# Patient Record
Sex: MALE | Race: WHITE | Employment: UNEMPLOYED | URBAN - METROPOLITAN AREA
[De-identification: names, ages, dates, MRNs, and addresses within clinical notes are randomized per-mention and may not be internally consistent; named-entity substitution may affect disease eponyms.]

---

## 2019-01-01 ENCOUNTER — HOSPITAL ENCOUNTER (OUTPATIENT)
Dept: ULTRASOUND IMAGING | Age: 54
Discharge: HOME | End: 2019-06-05
Attending: INTERNAL MEDICINE | Admitting: INTERNAL MEDICINE
Payer: COMMERCIAL

## 2019-01-01 ENCOUNTER — HOSPITAL ENCOUNTER (OUTPATIENT)
Dept: ULTRASOUND IMAGING | Age: 54
Discharge: HOME | End: 2019-07-19
Attending: INTERNAL MEDICINE | Admitting: INTERNAL MEDICINE
Payer: MEDICAID

## 2019-01-01 ENCOUNTER — OFFICE VISIT (OUTPATIENT)
Dept: HEMATOLOGY | Age: 54
End: 2019-01-01

## 2019-01-01 ENCOUNTER — HOSPITAL ENCOUNTER (OUTPATIENT)
Dept: ULTRASOUND IMAGING | Age: 54
Discharge: HOME | End: 2019-06-05
Attending: INTERNAL MEDICINE | Admitting: INTERNAL MEDICINE

## 2019-01-01 ENCOUNTER — HOSPITAL ENCOUNTER (OUTPATIENT)
Dept: LAB | Age: 54
Discharge: HOME | End: 2019-05-09
Payer: MEDICAID

## 2019-01-01 ENCOUNTER — HOSPITAL ENCOUNTER (OUTPATIENT)
Dept: LAB | Age: 54
Discharge: HOME | End: 2019-06-05
Attending: INTERNAL MEDICINE | Admitting: INTERNAL MEDICINE
Payer: COMMERCIAL

## 2019-01-01 VITALS
SYSTOLIC BLOOD PRESSURE: 104 MMHG | TEMPERATURE: 99.2 F | RESPIRATION RATE: 22 BRPM | TEMPERATURE: 98.3 F | HEART RATE: 72 BPM | DIASTOLIC BLOOD PRESSURE: 44 MMHG | RESPIRATION RATE: 22 BRPM | BODY MASS INDEX: 23.66 KG/M2 | DIASTOLIC BLOOD PRESSURE: 53 MMHG | HEIGHT: 71 IN | OXYGEN SATURATION: 96 % | WEIGHT: 169 LBS | HEIGHT: 71 IN | HEART RATE: 112 BPM | SYSTOLIC BLOOD PRESSURE: 89 MMHG | OXYGEN SATURATION: 98 % | WEIGHT: 168 LBS | BODY MASS INDEX: 23.52 KG/M2

## 2019-01-01 VITALS
OXYGEN SATURATION: 98 % | WEIGHT: 157 LBS | RESPIRATION RATE: 16 BRPM | DIASTOLIC BLOOD PRESSURE: 47 MMHG | SYSTOLIC BLOOD PRESSURE: 107 MMHG | BODY MASS INDEX: 21.98 KG/M2 | HEIGHT: 71 IN | TEMPERATURE: 99.9 F | HEART RATE: 85 BPM

## 2019-01-01 VITALS
DIASTOLIC BLOOD PRESSURE: 57 MMHG | OXYGEN SATURATION: 96 % | WEIGHT: 168.9 LBS | BODY MASS INDEX: 23.65 KG/M2 | HEIGHT: 71 IN | HEART RATE: 68 BPM | RESPIRATION RATE: 20 BRPM | SYSTOLIC BLOOD PRESSURE: 102 MMHG | TEMPERATURE: 98.3 F

## 2019-01-01 VITALS
HEART RATE: 69 BPM | TEMPERATURE: 97.8 F | WEIGHT: 168.5 LBS | SYSTOLIC BLOOD PRESSURE: 74 MMHG | HEIGHT: 71 IN | RESPIRATION RATE: 16 BRPM | DIASTOLIC BLOOD PRESSURE: 34 MMHG | OXYGEN SATURATION: 100 % | BODY MASS INDEX: 23.59 KG/M2

## 2019-01-01 DIAGNOSIS — R18.8 OTHER ASCITES: ICD-10-CM

## 2019-01-01 DIAGNOSIS — K70.31 ALCOHOLIC CIRRHOSIS OF LIVER WITH ASCITES (HCC): Primary | ICD-10-CM

## 2019-01-01 DIAGNOSIS — K70.31 ALCOHOLIC CIRRHOSIS OF LIVER WITH ASCITES (HCC): ICD-10-CM

## 2019-01-01 DIAGNOSIS — K70.30 ALCOHOLIC CIRRHOSIS OF LIVER WITHOUT ASCITES (HCC): ICD-10-CM

## 2019-01-01 DIAGNOSIS — K70.30 ALCOHOLIC CIRRHOSIS OF LIVER WITHOUT ASCITES (HCC): Primary | ICD-10-CM

## 2019-01-01 LAB
AFP L3 MFR SERPL: 11.6 % (ref 0–9.9)
AFP SERPL-MCNC: 4.2 NG/ML (ref 0–8)
ALBUMIN SERPL-MCNC: 1.8 G/DL (ref 3.4–5)
ALBUMIN SERPL-MCNC: 2.1 G/DL (ref 3.4–5)
ALBUMIN/GLOB SERPL: 0.3 {RATIO} (ref 0.8–1.7)
ALBUMIN/GLOB SERPL: 0.4 {RATIO} (ref 0.8–1.7)
ALP SERPL-CCNC: 171 U/L (ref 45–117)
ALP SERPL-CCNC: 256 U/L (ref 45–117)
ALT SERPL-CCNC: 26 U/L (ref 16–61)
ALT SERPL-CCNC: 26 U/L (ref 16–61)
ANION GAP SERPL CALC-SCNC: 7 MMOL/L (ref 3–18)
ANION GAP SERPL CALC-SCNC: 9 MMOL/L (ref 3–18)
APPEARANCE FLD: ABNORMAL
AST SERPL-CCNC: 86 U/L (ref 15–37)
AST SERPL-CCNC: 95 U/L (ref 15–37)
BASOPHILS # BLD: 0 K/UL (ref 0–0.1)
BASOPHILS # BLD: 0 K/UL (ref 0–0.1)
BASOPHILS NFR BLD: 0 % (ref 0–2)
BASOPHILS NFR BLD: 0 % (ref 0–3)
BILIRUB DIRECT SERPL-MCNC: 2.4 MG/DL (ref 0–0.2)
BILIRUB DIRECT SERPL-MCNC: 2.7 MG/DL (ref 0–0.2)
BILIRUB SERPL-MCNC: 3.9 MG/DL (ref 0.2–1)
BILIRUB SERPL-MCNC: 5 MG/DL (ref 0.2–1)
BUN SERPL-MCNC: 10 MG/DL (ref 7–18)
BUN SERPL-MCNC: 9 MG/DL (ref 7–18)
BUN/CREAT SERPL: 9 (ref 12–20)
BUN/CREAT SERPL: 9 (ref 12–20)
CALCIUM SERPL-MCNC: 7.7 MG/DL (ref 8.5–10.1)
CALCIUM SERPL-MCNC: 7.9 MG/DL (ref 8.5–10.1)
CHLORIDE SERPL-SCNC: 104 MMOL/L (ref 100–108)
CHLORIDE SERPL-SCNC: 99 MMOL/L (ref 100–108)
CO2 SERPL-SCNC: 24 MMOL/L (ref 21–32)
CO2 SERPL-SCNC: 26 MMOL/L (ref 21–32)
COLOR FLD: YELLOW
COMMENT, 144067: NORMAL
CREAT SERPL-MCNC: 1.01 MG/DL (ref 0.6–1.3)
CREAT SERPL-MCNC: 1.06 MG/DL (ref 0.6–1.3)
DIFFERENTIAL METHOD BLD: ABNORMAL
DIFFERENTIAL METHOD BLD: ABNORMAL
EOSINOPHIL # BLD: 0 K/UL (ref 0–0.4)
EOSINOPHIL # BLD: 0.4 K/UL (ref 0–0.4)
EOSINOPHIL NFR BLD: 0 % (ref 0–5)
EOSINOPHIL NFR BLD: 4 % (ref 0–5)
EOSINOPHIL NFR FLD MANUAL: 0 %
ERYTHROCYTE [DISTWIDTH] IN BLOOD BY AUTOMATED COUNT: 17.8 % (ref 11.6–14.5)
ERYTHROCYTE [DISTWIDTH] IN BLOOD BY AUTOMATED COUNT: 17.9 % (ref 11.6–14.5)
ETHANOL SERPL-MCNC: 196 MG/DL (ref 0–3)
FERRITIN SERPL-MCNC: 72 NG/ML (ref 8–388)
GLOBULIN SER CALC-MCNC: 5.3 G/DL (ref 2–4)
GLOBULIN SER CALC-MCNC: 5.5 G/DL (ref 2–4)
GLUCOSE SERPL-MCNC: 69 MG/DL (ref 74–99)
GLUCOSE SERPL-MCNC: 77 MG/DL (ref 74–99)
HAV AB SER QL IA: POSITIVE
HBV CORE AB SERPL QL IA: NEGATIVE
HBV SURFACE AB SER QL IA: NEGATIVE
HBV SURFACE AB SERPL IA-ACNC: <3.1 MIU/ML
HBV SURFACE AG SER QL: <0.1 INDEX
HBV SURFACE AG SER QL: NEGATIVE
HCT VFR BLD AUTO: 26.1 % (ref 36–48)
HCT VFR BLD AUTO: 26.7 % (ref 36–48)
HCV AB S/CO SERPL IA: 0.2 S/CO RATIO (ref 0–0.9)
HEP BS AB COMMENT,HBSAC: ABNORMAL
HGB BLD-MCNC: 8.8 G/DL (ref 13–16)
HGB BLD-MCNC: 9 G/DL (ref 13–16)
INR PPP: 2.2 (ref 0.8–1.2)
INR PPP: 2.6 (ref 0.8–1.2)
INR PPP: 3.2 (ref 0.8–1.2)
IRON SATN MFR SERPL: 41 %
IRON SERPL-MCNC: 98 UG/DL (ref 50–175)
LYMPHOCYTES # BLD: 2.9 K/UL (ref 0.9–3.6)
LYMPHOCYTES # BLD: 3.3 K/UL (ref 0.8–3.5)
LYMPHOCYTES NFR BLD: 28 % (ref 21–52)
LYMPHOCYTES NFR BLD: 33 % (ref 20–51)
LYMPHOCYTES NFR FLD: 25 %
MACROPHAGES NFR FLD: 58 %
MCH RBC QN AUTO: 33.6 PG (ref 24–34)
MCH RBC QN AUTO: 34 PG (ref 24–34)
MCHC RBC AUTO-ENTMCNC: 33.7 G/DL (ref 31–37)
MCHC RBC AUTO-ENTMCNC: 33.7 G/DL (ref 31–37)
MCV RBC AUTO: 100.8 FL (ref 74–97)
MCV RBC AUTO: 99.6 FL (ref 74–97)
MONOCYTES # BLD: 1.3 K/UL (ref 0–1)
MONOCYTES # BLD: 2.1 K/UL (ref 0.05–1.2)
MONOCYTES NFR BLD: 13 % (ref 2–9)
MONOCYTES NFR BLD: 20 % (ref 3–10)
MONOCYTES NFR FLD: 0 %
NEUTROPHILS NFR FLD: 17 %
NEUTS BAND # FLD: 0 %
NEUTS BAND NFR BLD MANUAL: 4 % (ref 0–5)
NEUTS SEG # BLD: 5 K/UL (ref 1.8–8)
NEUTS SEG # BLD: 5.1 K/UL (ref 1.8–8)
NEUTS SEG NFR BLD: 48 % (ref 40–73)
NEUTS SEG NFR BLD: 50 % (ref 42–75)
NUC CELL # FLD: 50 /CU MM
PLATELET # BLD AUTO: 110 K/UL (ref 135–420)
PLATELET # BLD AUTO: 111 K/UL (ref 135–420)
PLATELET # BLD AUTO: 134 K/UL (ref 135–420)
PMV BLD AUTO: 12.7 FL (ref 9.2–11.8)
PMV BLD AUTO: 12.8 FL (ref 9.2–11.8)
POTASSIUM SERPL-SCNC: 3.8 MMOL/L (ref 3.5–5.5)
POTASSIUM SERPL-SCNC: 4.3 MMOL/L (ref 3.5–5.5)
PROT SERPL-MCNC: 7.3 G/DL (ref 6.4–8.2)
PROT SERPL-MCNC: 7.4 G/DL (ref 6.4–8.2)
PROTHROMBIN TIME: 24.3 SEC (ref 11.5–15.2)
PROTHROMBIN TIME: 28.1 SEC (ref 11.5–15.2)
PROTHROMBIN TIME: 33 SEC (ref 11.5–15.2)
RBC # BLD AUTO: 2.62 M/UL (ref 4.7–5.5)
RBC # BLD AUTO: 2.65 M/UL (ref 4.7–5.5)
RBC # FLD: 560 /CU MM
RBC MORPH BLD: ABNORMAL
SODIUM SERPL-SCNC: 132 MMOL/L (ref 136–145)
SODIUM SERPL-SCNC: 137 MMOL/L (ref 136–145)
SPECIMEN SOURCE FLD: ABNORMAL
TIBC SERPL-MCNC: 239 UG/DL (ref 250–450)
WBC # BLD AUTO: 10 K/UL (ref 4.6–13.2)
WBC # BLD AUTO: 10.5 K/UL (ref 4.6–13.2)
WBC MORPH BLD: ABNORMAL

## 2019-01-01 PROCEDURE — 87340 HEPATITIS B SURFACE AG IA: CPT

## 2019-01-01 PROCEDURE — 76705 ECHO EXAM OF ABDOMEN: CPT

## 2019-01-01 PROCEDURE — 74011250636 HC RX REV CODE- 250/636

## 2019-01-01 PROCEDURE — 86803 HEPATITIS C AB TEST: CPT

## 2019-01-01 PROCEDURE — 85049 AUTOMATED PLATELET COUNT: CPT

## 2019-01-01 PROCEDURE — 89051 BODY FLUID CELL COUNT: CPT

## 2019-01-01 PROCEDURE — 83540 ASSAY OF IRON: CPT

## 2019-01-01 PROCEDURE — 82107 ALPHA-FETOPROTEIN L3: CPT

## 2019-01-01 PROCEDURE — 36415 COLL VENOUS BLD VENIPUNCTURE: CPT

## 2019-01-01 PROCEDURE — 85610 PROTHROMBIN TIME: CPT

## 2019-01-01 PROCEDURE — P9047 ALBUMIN (HUMAN), 25%, 50ML: HCPCS

## 2019-01-01 PROCEDURE — 86704 HEP B CORE ANTIBODY TOTAL: CPT

## 2019-01-01 PROCEDURE — 49083 ABD PARACENTESIS W/IMAGING: CPT

## 2019-01-01 PROCEDURE — 80048 BASIC METABOLIC PNL TOTAL CA: CPT

## 2019-01-01 PROCEDURE — 80076 HEPATIC FUNCTION PANEL: CPT

## 2019-01-01 PROCEDURE — 82728 ASSAY OF FERRITIN: CPT

## 2019-01-01 PROCEDURE — 85025 COMPLETE CBC W/AUTO DIFF WBC: CPT

## 2019-01-01 PROCEDURE — 86708 HEPATITIS A ANTIBODY: CPT

## 2019-01-01 PROCEDURE — 86706 HEP B SURFACE ANTIBODY: CPT

## 2019-01-01 PROCEDURE — 80307 DRUG TEST PRSMV CHEM ANLYZR: CPT

## 2019-01-01 RX ORDER — LIDOCAINE HYDROCHLORIDE 10 MG/ML
10 INJECTION, SOLUTION EPIDURAL; INFILTRATION; INTRACAUDAL; PERINEURAL
Status: COMPLETED | OUTPATIENT
Start: 2019-01-01 | End: 2019-01-01

## 2019-01-01 RX ORDER — IBUPROFEN 200 MG
800 CAPSULE ORAL
COMMUNITY

## 2019-01-01 RX ORDER — ALBUMIN HUMAN 250 G/1000ML
25 SOLUTION INTRAVENOUS AS NEEDED
Status: DISCONTINUED | OUTPATIENT
Start: 2019-01-01 | End: 2019-01-01 | Stop reason: HOSPADM

## 2019-01-01 RX ORDER — LANOLIN ALCOHOL/MO/W.PET/CERES
400 CREAM (GRAM) TOPICAL DAILY
COMMUNITY

## 2019-01-01 RX ORDER — NADOLOL 40 MG/1
TABLET ORAL DAILY
COMMUNITY
End: 2019-01-01

## 2019-01-01 RX ORDER — NADOLOL 40 MG/1
40 TABLET ORAL DAILY
COMMUNITY
End: 2019-01-01

## 2019-01-01 RX ORDER — OMEPRAZOLE 40 MG/1
40 CAPSULE, DELAYED RELEASE ORAL DAILY
COMMUNITY

## 2019-01-01 RX ORDER — SPIRONOLACTONE 100 MG/1
100 TABLET, FILM COATED ORAL 2 TIMES DAILY
COMMUNITY
End: 2019-01-01 | Stop reason: SDUPTHER

## 2019-01-01 RX ORDER — FUROSEMIDE 40 MG/1
80 TABLET ORAL DAILY
Qty: 60 TAB | Refills: 6 | Status: SHIPPED | OUTPATIENT
Start: 2019-01-01 | End: 2019-01-01 | Stop reason: SDUPTHER

## 2019-01-01 RX ORDER — FUROSEMIDE 40 MG/1
TABLET ORAL DAILY
COMMUNITY
End: 2019-01-01 | Stop reason: SDUPTHER

## 2019-01-01 RX ORDER — SPIRONOLACTONE 100 MG/1
200 TABLET, FILM COATED ORAL DAILY
Qty: 60 TAB | Refills: 6 | Status: SHIPPED | OUTPATIENT
Start: 2019-01-01

## 2019-01-01 RX ORDER — FUROSEMIDE 40 MG/1
80 TABLET ORAL DAILY
Qty: 60 TAB | Refills: 6 | Status: SHIPPED | OUTPATIENT
Start: 2019-01-01

## 2019-01-01 RX ORDER — ALBUMIN HUMAN 250 G/1000ML
SOLUTION INTRAVENOUS
Status: COMPLETED
Start: 2019-01-01 | End: 2019-01-01

## 2019-01-01 RX ORDER — LACTULOSE 10 G/15ML
20 SOLUTION ORAL; RECTAL 2 TIMES DAILY
Qty: 480 ML | Refills: 6 | Status: SHIPPED | OUTPATIENT
Start: 2019-01-01

## 2019-01-01 RX ADMIN — ALBUMIN HUMAN 25 G: 250 SOLUTION INTRAVENOUS at 14:33

## 2019-01-01 RX ADMIN — LIDOCAINE HYDROCHLORIDE 9 ML: 10 INJECTION, SOLUTION EPIDURAL; INFILTRATION; INTRACAUDAL; PERINEURAL at 14:10

## 2019-01-01 RX ADMIN — ALBUMIN (HUMAN) 25 G: 0.25 INJECTION, SOLUTION INTRAVENOUS at 14:33

## 2019-04-03 NOTE — LETTER
4/7/19 Patient: Deshaun De La Torre YOB: 1965 Date of Visit: 4/3/2019 Winnie Holcomb MD 
Martin Villavicencio [address] [place] [address] VIA Facsimile: [phone] Dear Winnie Holcomb MD, Thank you for referring Mr. Deshaun De La Torre to [address] [place] [address] for evaluation. My notes for this consultation are attached. If you have questions, please do not hesitate to call me. I look forward to following your patient along with you. Sincerely, Juan Carlos Kelly MD

## 2019-04-03 NOTE — PROGRESS NOTES
Vikram Naylor is a 47 y.o. male 1. Have you been to the ER, urgent care clinic or hospitalized since your last visit? NO.  
 
2. Have you seen or consulted any other health care providers outside of the [address] [place] [address] since your last visit (Include any pap smears or colon screening)? NO Learning Assessment 4/3/2019 PRIMARY LEARNER Patient BARRIERS PRIMARY LEARNER NONE  
CO-LEARNER CAREGIVER No  
PRIMARY LANGUAGE ENGLISH  
LEARNER PREFERENCE PRIMARY LISTENING  
ANSWERED BY PATIENT  
RELATIONSHIP SELF

## 2019-04-03 NOTE — PROGRESS NOTES
[address] 137 [facility] Ginette Mcgovern MD, CORA Jacob, SAM Brady, [facility]   FERNANDA Guaman NP [address] [facility] [address] 
  at [address], [address] [place] [address] 22. 
  [phone] FAX: [address] [place] [address] Avenue 
  [facility] 
  [address], [address] 98 [facility], [address] 
  [phone] FAX: [phone] Patient Care Team: 
Issac Hein MD as PCP - General (Internal Medicine) Problem List  Date Reviewed: 4/7/2019 Codes Class Noted [facility] ICD-10-CM: K74.60 ICD-9-CM: 571.5  4/7/2019 Alcohol abuse ICD-10-CM: F10.10 ICD-9-CM: 305.00  4/7/2019 Esophageal varices with bleeding (HCC) ICD-10-CM: I85.01 
ICD-9-CM: 456.0  4/7/2019 Esophageal ulcer ICD-10-CM: K22.10 ICD-9-CM: 530.20  4/7/2019 Ascites ICD-10-CM: R18.8 ICD-9-CM: 789.59  4/7/2019 The clinicians listed above have asked me to see Vikram Naylor in consultation regarding management of cirrhosis secondary to alcohol. All medical records sent by the referring physicians were reviewed The patient is a 47 y.o. Black male who was found to have chronic liver disease and cirrhosis in 5/2018 when he was hospitalized at [facility] for upper GI bleeding. An assessment of liver fibrosis with biopsy or elastography has not been performed. Serologic evaluation for markers of chronic liver disease has either not been performed or the results are not available to me. The patient has developed the following complications of cirrhosis: esophageal varices, variceal bleeding, ascites, The patient notes fatigue, He continues to drink alcohol although this is less than before The patient has not experienced pain in the right side over the liver, yellowing of the eyes or skin, problems concentrating, The patient has limitations in functional activities which can be attributed to the liver disease. ASSESSMENT AND PLAN: 
Cirrhosis Cirrhosis is presumed secondary to alcohol. The diagnosis of cirrhosis is based upon complications of cirrhosis. Will perform laboratory testing to monitor liver function and degree of liver injury. This included  BMP, hepatic panel, CBC with platelet count, INR. Serologic testing for causes of chronic liver disease were ordered. Ascites Asciteshas resolved with current dose of diuretics and paracentesis. Will continue the current dose of diuretics at step 1. The patient was counseled regarding the need to maintain sodium restriction and the types of foods containing high amounts of sodium to be avoided. Lower extremity edema Edema has resolved with current dose of diuretics. Will continue the current dose of diuretics at step 1. Screening for Esophageal varices The patient has esophageal varices with prior bleeding. The last EGD to assess for varices was performed in 10/2018. Will schedule for EGD to assess for varices and need for banding. Hepatic encephalopathy Overt HE has recently developed. Will initiate treatment with lactulose. There is no need to restrict dietary protein at this time. Screening for Hepatocellular Carcinoma [facility]. screening will be performed AFP was ordered today and ultrasound will be scheduled. Treatment of other medical problems in patients with chronic liver disease The patient has cirrhrosis and should avoid the following medications: 
Benzodiazapines which could exacerbate HE. 
NSAIDs which are associated with a higher rate of developing DEJAN. The patient can take the following medications as these will not impact the patient's current liver disease. Any medications utilized for treatment of DM Statins to treat hypercholesterolemia The patient consumes alcohol on a regular basis. This increases the risk of toxicity from acetaminophen. This analgesic should be avoided until the patient has been abstinent from alcohol for 6 months. Counseling for alcohol in patients with chronic liver disease The patient has cirrhosis and was advised to be abstinent from all alcohol including non-alcoholic beer which does contain some alcohol. The patient has an alcohol abuse disorder and it was suggested to enter alcohol counseling or attend AA. Vaccinations The need for vaccination against viral hepatitis A and B will be assessed with serologic and instituted as appropriate. Routine vaccinations against other bacterial and viral agents can be performed as indicated. Annual flu vaccination should be administered if indicated. ALLERGIES Allergies Allergen Reactions  Bee Sting [Sting, Bee] Anaphylaxis MEDICATIONS Current Outpatient Medications Medication Sig  FOLIC ACID PO Take 1 mg by mouth daily.  furosemide (LASIX) 40 mg tablet Take  by mouth daily.  omeprazole (PRILOSEC) 40 mg capsule Take 40 mg by mouth daily.  spironolactone (ALDACTONE) 100 mg tablet Take 100 mg by mouth two (2) times a day.  nadolol (CORGARD) 40 mg tablet Take  by mouth daily. No current facility-administered medications for this visit. SYSTEM REVIEW NOT RELATED TO LIVER DISEASE OR REVIEWED ABOVE: 
Constitution systems: Negative for fever, chills, weight gain, weight loss. Eyes: Negative for visual changes. ENT: Negative for sore throat, painful swallowing. Respiratory: Negative for cough, hemoptysis, SOB. Cardiology: Negative for chest pain, palpitations. GI:  Negative for constipation or diarrhea. : Negative for urinary frequency, dysuria, hematuria, nocturia. Skin: Negative for rash. Hematology: Negative for easy bruising, blood clots. Musculo-skelatal: Negative for back pain, muscle pain, weakness. Neurologic: Negative for headaches, dizziness, vertigo, memory problems not related to HE. Psychology: Negative for anxiety, depression. FAMILY HISTORY: 
The father  of DM and ETOH abuse. The mother  of dementia. SOCIAL HISTORY: 
The patient has never been . The patient has 3 children, The patient currently smokes 1 pack of tobacco daily. 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 Ruel Mclean PHYSICAL EXAMINATION: 
Visit Vitals /47 (BP 1 Location: Right arm, BP Patient Position: Sitting) Pulse 85 Temp 99.9 °F (37.7 °C) (Tympanic) Resp 16 Ht 5' 11\" (1.803 m) Wt 157 lb (71.2 kg) SpO2 98% BMI 21.90 kg/m² General: No acute distress. Eyes: Sclera anicteric. ENT: No oral lesions. Thyroid normal. 
Nodes: No adenopathy. Skin: No spider angiomata. No jaundice. No palmar erythema. Respiratory: Lungs clear to auscultation. Cardiovascular: Regular heart rate. No murmurs. No JVD. Abdomen: Soft non-tender. Liver size normal to percussion/palpation. Spleen not palpable. No obvious ascites. Extremities: No edema. No muscle wasting. No gross arthritic changes. Neurologic: Alert and oriented. Cranial nerves grossly intact. No asterixis. LABORATORY STUDIES: 
Recent liver function panel, CBC with platelet count and BMP are not available. These studies will be performed. SEROLOGIES: 
Not available or performed. Testing will be performed as indicated. LIVER HISTOLOGY: 
Not available or performed ENDOSCOPIC PROCEDURES: 
Not available or performed RADIOLOGY: 
Not available or performed OTHER TESTING: 
Not available or performed FOLLOW-UP: 
 All of the issues listed above in the Assessment and Plan were discussed with the patient. All questions were answered. The patient expressed a clear understanding of the above. [address] [address] in 4 weeks for elastography to review all data and determine the treatment plan. MD Emerald [facility] [address] [address], [address] [place]  22. 
[phone] [address] [place] [address]

## 2019-04-07 PROBLEM — R18.8 ASCITES: Status: ACTIVE | Noted: 2019-01-01

## 2019-04-07 PROBLEM — K74.60 CIRRHOSIS (HCC): Status: ACTIVE | Noted: 2019-01-01

## 2019-04-07 PROBLEM — F10.10 ALCOHOL ABUSE: Status: ACTIVE | Noted: 2019-01-01

## 2019-04-07 PROBLEM — K22.10 ESOPHAGEAL ULCER: Status: ACTIVE | Noted: 2019-01-01

## 2019-04-07 PROBLEM — I85.01 ESOPHAGEAL VARICES WITH BLEEDING (HCC): Status: ACTIVE | Noted: 2019-01-01

## 2019-06-05 NOTE — Clinical Note
8/3/19 Patient: Florencio hZao YOB: 1965 Date of Visit: 6/5/2019 Vasquez James MD 
Martin Villavicencio [address] [place] [address] VIA Facsimile: [phone] Dear Vasquez James MD, Thank you for referring Mr. Florencio Zhao to [address] [place] [address],[address] for evaluation. My notes for this consultation are attached. If you have questions, please do not hesitate to call me. I look forward to following your patient along with you. Sincerely, oScorro García MD

## 2019-06-05 NOTE — PROGRESS NOTES
Pt did not have enough fluid to tap,  Pt discharged via amb in care of self. Unhappy how stomack sticks out,  States has hernia but Md states to hazardous to fix.   States that area is sore, no other areas of discomfort

## 2019-06-05 NOTE — DISCHARGE INSTRUCTIONS
Patient Education     DISCHARGE SUMMARY from Nurse    PATIENT INSTRUCTIONS:    After general anesthesia or intravenous sedation, for 24 hours or while taking prescription Narcotics:  · Limit your activities  · Do not drive and operate hazardous machinery  · Do not make important personal or business decisions  · Do  not drink alcoholic beverages  · If you have not urinated within 8 hours after discharge, please contact your surgeon on call. Report the following to your surgeon:  · Excessive pain, swelling, redness or odor of or around the surgical area  · Temperature over 100.5  · Nausea and vomiting lasting longer than 4 hours or if unable to take medications  · Any signs of decreased circulation or nerve impairment to extremity: change in color, persistent  numbness, tingling, coldness or increase pain  · Any questions    What to do at Home:  Recommended activity: {discharge activity:87447}, ***    If you experience any of the following symptoms ***, please follow up with ***. *  Please give a list of your current medications to your Primary Care Provider. *  Please update this list whenever your medications are discontinued, doses are      changed, or new medications (including over-the-counter products) are added. *  Please carry medication information at all times in case of emergency situations. These are general instructions for a healthy lifestyle:    No smoking/ No tobacco products/ Avoid exposure to second hand smoke  Surgeon General's Warning:  Quitting smoking now greatly reduces serious risk to your health.     Obesity, smoking, and sedentary lifestyle greatly increases your risk for illness    A healthy diet, regular physical exercise & weight monitoring are important for maintaining a healthy lifestyle    You may be retaining fluid if you have a history of heart failure or if you experience any of the following symptoms:  Weight gain of 3 pounds or more overnight or 5 pounds in a week, increased swelling in our hands or feet or shortness of breath while lying flat in bed. Please call your doctor as soon as you notice any of these symptoms; do not wait until your next office visit. Recognize signs and symptoms of STROKE:    F-face looks uneven    A-arms unable to move or move unevenly    S-speech slurred or non-existent    T-time-call 911 as soon as signs and symptoms begin-DO NOT go       Back to bed or wait to see if you get better-TIME IS BRAIN. Warning Signs of HEART ATTACK     Call 911 if you have these symptoms:   Chest discomfort. Most heart attacks involve discomfort in the center of the chest that lasts more than a few minutes, or that goes away and comes back. It can feel like uncomfortable pressure, squeezing, fullness, or pain.  Discomfort in other areas of the upper body. Symptoms can include pain or discomfort in one or both arms, the back, neck, jaw, or stomach.  Shortness of breath with or without chest discomfort.  Other signs may include breaking out in a cold sweat, nausea, or lightheadedness. Don't wait more than five minutes to call 911 - MINUTES MATTER! Fast action can save your life. Calling 911 is almost always the fastest way to get lifesaving treatment. Emergency Medical Services staff can begin treatment when they arrive -- up to an hour sooner than if someone gets to the hospital by car. The discharge information has been reviewed with the {PATIENT PARENT GUARDIAN:90366}. The {PATIENT PARENT GUARDIAN:51707} verbalized understanding. Discharge medications reviewed with the {Dishcarge meds reviewed XGAC:99745} and appropriate educational materials and side effects teaching were provided. ___________________________________________________________________________________________________________________________________     Learning About Paracentesis  What is paracentesis?   Paracentesis (say \"ibqy-ta-zxb-PRINCESS-edyta\") is a procedure that removes fluid from the belly. The buildup of fluid may be caused by infection, inflammation, an injury, or other problems. Swelling from too much fluid may cause pain or trouble breathing. The doctor will remove the extra fluid with a needle attached to a tube. Your doctor may remove the fluid to:  · Diagnose infection, injury, or other conditions. · Relieve pressure in your belly. How is the procedure done? Your doctor or nurse will clean the area of your belly where the needle will go in. Then he or she will put sterile towels around the area. You may get a shot of numbing medicine in your belly. Then your doctor will gently insert a needle where the fluid is. He or she may attach a tube (catheter) to the site to help collect the fluid. The procedure may take from a few minutes to 30 minutes or more. After the fluid has drained, your doctor will take out the needle or catheter and put a bandage on the site. What can you expect after the procedure? Your doctor will watch your pulse, blood pressure, and temperature for about an hour. If your doctor thinks that testing the fluid can help find the cause of a problem, he or she will send it to a lab. For up to 2 days after the procedure, you may have a small amount of clear fluid coming out of the site where the needle was inserted, especially if you had a lot of fluid removed. You may need to change the bandage on the site. You can do your normal activities after the procedure, unless your doctor tells you not to. If fluid builds up in your belly again, your doctor may repeat this procedure. When should you call for help? Call 911 anytime you think you may need emergency care. For example, call if:  · You passed out (lost consciousness). Call your doctor now or seek immediate medical care if:  · You have symptoms of infection, such as:  ? Increased pain, swelling, warmth, or redness. ? Red streaks or pus. ? A fever.   · You are dizzy or lightheaded, or you feel like you may faint. · You have new or worse belly pain. Watch closely for changes in your health, and be sure to contact your doctor if:  · Fluid builds up in your belly again. · You do not get better as expected. Where can you learn more? Go to http://jericho-monika.info/. Enter X447 in the search box to learn more about \"Learning About Paracentesis. \"  Current as of: March 27, 2018  Content Version: 11.9  © 6657-0636 91 [employer] [employer], [employer]. Care instructions adapted under license by [employer] (which disclaims liability or warranty for this information). If you have questions about a medical condition or this instruction, always ask your healthcare professional. [address] any warranty or liability for your use of this information.   Patient {ARMBANDS:50692}

## 2019-06-05 NOTE — PROGRESS NOTES
[address] [facility], Penelope CHAU Elta Manzanilla, MD Willye Paschal, PA-C Viann Nutley, [facility]-BC     Michelle Oneal, [facility]   Latesha Camacho, FNP-CAITLYN Cohen, [facility]       Emer[facility] [address]    at [address], [address] [place]    [address] [facility] 22.    [phone]    FAX: [address] [place] [address] Avenue    [address], [address]    [phone]    FAX: [phone]       Patient Care Team:  Janes Hou MD as PCP - General (Internal Medicine)      Problem List  Date Reviewed: 4/7/2019          Codes Class Noted    Cirrhosis ([facility] ICD-10-CM: K74.60  ICD-9-CM: 571.5  4/7/2019        Alcohol abuse ICD-10-CM: F10.10  ICD-9-CM: 305.00  4/7/2019        Esophageal varices with bleeding [facility]) ICD-10-CM: I85.01  ICD-9-CM: 456.0  4/7/2019        Esophageal ulcer ICD-10-CM: K22.10  ICD-9-CM: 530.20  4/7/2019        Ascites ICD-10-CM: R18.8  ICD-9-CM: 789.59  4/7/2019                Nina Hodgkins returns to the [address] for management of cirrhosis secondary to alcoholic liver disease. The active problem list, all pertinent past medical history, medications, endoscopic studies, radiologic findings and laboratory findings related to the liver disorder were reviewed with the patient. The patient is a 47 y.o. Black male who was found to have chronic liver disease and cirrhosis in 5/2018 when he was hospitalized at [facility] for upper GI bleeding. An assessment of liver fibrosis with biopsy or elastography has not been performed. Serologic evaluation for markers of chronic liver disease was negative.     The patient has developed the following complications of cirrhosis: esophageal varices, variceal bleeding, ascites,     The patient notes fatigue, swelling of the abdomen,     He continues to drink alcohol although this is less than before    The patient has not experienced pain in the right side over the liver, yellowing of the eyes or skin, problems concentrating,     The patient has limitations in functional activities which can be attributed to the liver disease. ASSESSMENT AND PLAN:  Cirrhosis  Cirrhosis is presumed secondary to alcohol. The diagnosis of cirrhosis is based upon complications of cirrhosis. The CTP is 12. Child class C. The MELD score is 27. The patient has a 90 day day mortality of 20% and a 2 year survival of 2%. This will only be improved if he stops consuming alcohol. Lower extremity edema  Edema has resolved with current dose of diuretics. Will continue the current dose of diuretics at step 1. Screening for Esophageal varices   The patient has esophageal varices with prior bleeding. The last EGD to assess for varices was performed in 5/2019. Small esophageal varices were present. Will schedule for EGD to assess for varices and need for banding in 5/2010. The patient is on a beta-blocker to reduce the risk of variceal hemorrhage. Will stop this medication because the patient has ascites and this is associated with an increased risk of DEJAN and HRS. Hepatic encephalopathy   Overt HE has recently developed. Will initiate treatment with lactulose. There is no need to restrict dietary protein at this time. Screening for Hepatocellular Carcinoma  HCC screening will be performed   AFP was ordered today and ultrasound will be scheduled. Treatment of other medical problems in patients with chronic liver disease  The patient has cirrhrosis and should avoid Benzodiazapines which could exacerbate HE, NSAIDs which are associated with a higher rate of developing DEJAN.         The patient can take the following medications as these will not impact the patient's current liver disease. Any medications utilized for treatment of DM, Statins to treat hypercholesterolemia    The patient consumes alcohol on a regular basis. This increases the risk of toxicity from acetaminophen. This analgesic should be avoided until the patient has been abstinent from alcohol for 6 months. Counseling for alcohol in patients with chronic liver disease  The patient has cirrhosis and was advised to be abstinent from all alcohol including non-alcoholic beer which does contain some alcohol. The patient has an alcohol abuse disorder and it was suggested to enter alcohol counseling or attend AA. Vaccinations   The need for vaccination against viral hepatitis A and B will be assessed with serologic and instituted as appropriate. Routine vaccinations against other bacterial and viral agents can be performed as indicated. Annual flu vaccination should be administered if indicated. ALLERGIES  Allergies   Allergen Reactions    Bee Sting [Sting, Bee] Anaphylaxis       MEDICATIONS  Current Outpatient Medications   Medication Sig    magnesium oxide (MAG-OX) 400 mg tablet Take 400 mg by mouth daily.  FOLIC ACID PO Take 1 mg by mouth daily.  omeprazole (PRILOSEC) 40 mg capsule Take 40 mg by mouth daily.  spironolactone (ALDACTONE) 100 mg tablet Take 100 mg by mouth two (2) times a day.  nadolol (CORGARD) 40 mg tablet Take  by mouth daily.  furosemide (LASIX) 40 mg tablet Take  by mouth daily. No current facility-administered medications for this visit. SYSTEM REVIEW NOT RELATED TO LIVER DISEASE OR REVIEWED ABOVE:  Constitution systems: Negative for fever, chills, weight gain, weight loss. Eyes: Negative for visual changes. ENT: Negative for sore throat, painful swallowing. Respiratory: Negative for cough, hemoptysis, SOB. Cardiology: Negative for chest pain, palpitations. GI:  Negative for constipation or diarrhea.   : Negative for urinary frequency, dysuria, hematuria, nocturia. Skin: Negative for rash. Hematology: Negative for easy bruising, blood clots. Musculo-skelatal: Negative for back pain, muscle pain, weakness. Neurologic: Negative for headaches, dizziness, vertigo, memory problems not related to HE. Psychology: Negative for anxiety, depression. FAMILY HISTORY:  The father  of DM and ETOH abuse. The mother  of dementia. SOCIAL HISTORY:  The patient has never been . The patient has 3 children,   The patient currently smokes 1 pack of tobacco daily. 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 .        PHYSICAL EXAMINATION:  Visit Vitals  BP (!) 89/44 (BP 1 Location: Right arm, BP Patient Position: Sitting)   Pulse 72   Temp 99.2 °F (37.3 °C) (Tympanic)   Resp 22   Ht 5' 11\" (1.803 m)   Wt 168 lb (76.2 kg)   SpO2 96%   BMI 23.43 kg/m²     General: No acute distress. Eyes: Sclera anicteric. ENT: No oral lesions. Thyroid normal.  Nodes: No adenopathy. Skin: No spider angiomata. No jaundice. No palmar erythema. Respiratory: Lungs clear to auscultation. Cardiovascular: Regular heart rate. No murmurs. No JVD. Abdomen: Soft non-tender. Liver size normal to percussion/palpation. Spleen not palpable. No obvious ascites. Extremities: No edema. No muscle wasting. No gross arthritic changes. Neurologic: Alert and oriented. Cranial nerves grossly intact. No asterixis.     LABORATORY STUDIES:  L[facility] [place] of [address] [place] [address] 2019   WBC 4.6 - 13.2 K/uL 10.0 10.5   ANC 1.8 - 8.0 K/UL 5.0 5.1   HGB 13.0 - 16.0 g/dL 9.0 (L) 8.8 (L)    - 420 K/uL 110 (L) 111 (L)   INR 0.8 - 1.2   2.6 (H) 2.2 (H)   AST 15 - 37 U/L 86 (H) 95 (H)   ALT 16 - 61 U/L 26 26   Alk Phos 45 - 117 U/L 171 (H) 256 (H)   Bili, Total 0.2 - 1.0 MG/DL 5.0 (H) 3.9 (H)   Bili, Direct 0.0 - 0.2 MG/DL 2.7 (H) 2.4 (H)   Albumin 3.4 - 5.0 g/dL 1.8 (L) 2.1 (L)   BUN 7.0 - 18 MG/DL 10 9   Creat 0.6 - 1.3 MG/DL 1.06 1.01   Na 136 - 145 mmol/L 132 (L) 137   K 3.5 - 5.5 mmol/L 4.3 3.8   Cl 100 - 108 mmol/L 99 (L) 104   CO2 21 - 32 mmol/L 26 24   Glucose 74 - 99 mg/dL 69 (L) 77     SEROLOGIES:  Serologies Latest Ref Rng & Units 5/9/2019   Hep A Ab, Total NEGATIVE   Positive (A)   Hep B Surface Ag <1.00 Index <0.10   Hep B Surface Ag Interp NEG   NEGATIVE   Hep B Core Ab, Total NEGATIVE   NEGATIVE   Hep B Surface Ab >10.0 mIU/mL <3.10 (L)   Hep B Surface Ab Interp POS   NEGATIVE (A)   Hep C Ab 0.0 - 0.9 s/co ratio 0.2   Ferritin 8 - 388 NG/ML 72   Iron % Saturation % 41     LIVER HISTOLOGY:  Not available or performed    ENDOSCOPIC PROCEDURES:  Not available or performed    RADIOLOGY:  Not available or performed    OTHER TESTING:  Not available or performed    FOLLOW-UP:  All of the issues listed above in the Assessment and Plan were discussed with the patient. All questions were answered. The patient expressed a clear understanding of the above. [address] [place] [address] in 4 weeks for elastography to review all data and determine the treatment plan.       Damaso Aldridge MD  [address] [address]  [address], [address] [place] [address], [address]  12 [facility]

## 2019-06-05 NOTE — PROGRESS NOTES
Sidney Gray is a 47 y.o. male      1. Have you been to the ER, urgent care clinic or hospitalized since your last visit? YES. Patient has been to [facility] ER since last visit to get a paracentesis. 2. Have you seen or consulted any other health care providers outside of the [address] [place] [address] since your last visit (Include any pap smears or colon screening)?  NO          Learning Assessment 4/3/2019   PRIMARY LEARNER Patient   BARRIERS PRIMARY LEARNER NONE   CO-LEARNER CAREGIVER No   PRIMARY LANGUAGE ENGLISH   LEARNER PREFERENCE PRIMARY LISTENING   ANSWERED BY PATIENT   RELATIONSHIP SELF

## 2019-07-17 NOTE — PROGRESS NOTES
Myriam Martins is a 47 y.o. male      1. Have you been to the ER, urgent care clinic or hospitalized since your last visit? YES. Patient has been to [facility] since last visit for ER visit twice. 2. Have you seen or consulted any other health care providers outside of the [address] [place] [address] since your last visit (Include any pap smears or colon screening)?  NO          Learning Assessment 4/3/2019   PRIMARY LEARNER Patient   BARRIERS PRIMARY LEARNER NONE   CO-LEARNER CAREGIVER No   PRIMARY LANGUAGE ENGLISH   LEARNER PREFERENCE PRIMARY LISTENING   ANSWERED BY PATIENT   RELATIONSHIP SELF

## 2019-07-17 NOTE — PROGRESS NOTES
PT aware of arrival time pre procedure. Will arrive at 1230 for 1330 procedure. Pt states no questions at this time.

## 2019-07-19 NOTE — PROGRESS NOTES
bp reading both arms 74/34 repeat 71/37. Pt without complaints. IV site oozing. Labs sent off for coags. Rad Nurse. Leonila Mejia RN called will notify MD.  4298 Dr. Sherrie Sarabia aware of bp readings & labs. Will proceed with procedure.

## 2019-07-19 NOTE — ROUTINE PROCESS
Ultrasound guided RLQ paracentesis was performed. Pt tolerated procedure well, no signs of distress. Peritoneal fluid was collected and sent to lab for cell count and diff.  7,100 mL of fluid drained. Dermabond and QuickClot Hemostatic dressing applied with Tegaderm. Pt returned to Care Unit in stable condition.

## 2019-07-19 NOTE — DISCHARGE INSTRUCTIONS
Patient Education     DISCHARGE SUMMARY from Nurse    PATIENT INSTRUCTIONS:    After general anesthesia or intravenous sedation, for 24 hours or while taking prescription Narcotics:  · Limit your activities  · Do not drive and operate hazardous machinery  · Do not make important personal or business decisions  · Do  not drink alcoholic beverages  · If you have not urinated within 8 hours after discharge, please contact your surgeon on call. Report the following to your surgeon:  · Excessive pain, swelling, redness or odor of or around the surgical area  · Temperature over 100.5  · Nausea and vomiting lasting longer than 4 hours or if unable to take medications  · Any signs of decreased circulation or nerve impairment to extremity: change in color, persistent  numbness, tingling, coldness or increase pain  · Any questions    What to do at Home:  Recommended activity: {discharge activity:51909}, ***    If you experience any of the following symptoms ***, please follow up with ***. *  Please give a list of your current medications to your Primary Care Provider. *  Please update this list whenever your medications are discontinued, doses are      changed, or new medications (including over-the-counter products) are added. *  Please carry medication information at all times in case of emergency situations. These are general instructions for a healthy lifestyle:    No smoking/ No tobacco products/ Avoid exposure to second hand smoke  Surgeon General's Warning:  Quitting smoking now greatly reduces serious risk to your health.     Obesity, smoking, and sedentary lifestyle greatly increases your risk for illness    A healthy diet, regular physical exercise & weight monitoring are important for maintaining a healthy lifestyle    You may be retaining fluid if you have a history of heart failure or if you experience any of the following symptoms:  Weight gain of 3 pounds or more overnight or 5 pounds in a week, increased swelling in our hands or feet or shortness of breath while lying flat in bed. Please call your doctor as soon as you notice any of these symptoms; do not wait until your next office visit. The discharge information has been reviewed with the {PATIENT PARENT GUARDIAN:92983}. The {PATIENT PARENT GUARDIAN:33189} verbalized understanding. Discharge medications reviewed with the {Dishcarge meds reviewed ZS:99421} and appropriate educational materials and side effects teaching were provided. ___________________________________________________________________________________________________________________________________     Learning About Paracentesis  What is paracentesis? Paracentesis (say \"frpq-cc-uil-PRINCESS-edyta\") is a procedure that removes fluid from the belly. The buildup of fluid may be caused by infection, inflammation, an injury, or other problems. Swelling from too much fluid may cause pain or trouble breathing. The doctor will remove the extra fluid with a needle attached to a tube. Your doctor may remove the fluid to:  · Diagnose infection, injury, or other conditions. · Relieve pressure in your belly. How is the procedure done? Your doctor or nurse will clean the area of your belly where the needle will go in. Then he or she will put sterile towels around the area. You may get a shot of numbing medicine in your belly. Then your doctor will gently insert a needle where the fluid is. He or she may attach a tube (catheter) to the site to help collect the fluid. The procedure may take from a few minutes to 30 minutes or more. After the fluid has drained, your doctor will take out the needle or catheter and put a bandage on the site. What can you expect after the procedure? Your doctor will watch your pulse, blood pressure, and temperature for about an hour. If your doctor thinks that testing the fluid can help find the cause of a problem, he or she will send it to a lab.   For up to 2 days after the procedure, you may have a small amount of clear fluid coming out of the site where the needle was inserted, especially if you had a lot of fluid removed. You may need to change the bandage on the site. You can do your normal activities after the procedure, unless your doctor tells you not to. If fluid builds up in your belly again, your doctor may repeat this procedure. When should you call for help? Call 911 anytime you think you may need emergency care. For example, call if:  · You passed out (lost consciousness). Call your doctor now or seek immediate medical care if:  · You have symptoms of infection, such as:  ? Increased pain, swelling, warmth, or redness. ? Red streaks or pus. ? A fever. · You are dizzy or lightheaded, or you feel like you may faint. · You have new or worse belly pain. Watch closely for changes in your health, and be sure to contact your doctor if:  · Fluid builds up in your belly again. · You do not get better as expected. Where can you learn more? Go to http://jericho-monika.info/. Enter X447 in the search box to learn more about \"Learning About Paracentesis. \"  Current as of: March 27, 2018  Content Version: 11.9  © 7357-1076 [employer]. Care instructions adapted under license by [employer] (which disclaims liability or warranty for this information). If you have questions about a medical condition or this instruction, always ask your healthcare professional. [place] [address] any warranty or liability for your use of this information.   Patient {ARMBANDS:75157}

## 2019-07-19 NOTE — PROGRESS NOTES
Inserted #22g iv cathinto left forearm without incident,  Quarter size hematoma instantly occurred, oozing noted around insertion site, cath aspirates and injects NS without incident, pressure dressing to site with iec,  IR called.  Orders for labs obtained,  Labs drawn from int post 10cc blank

## 2019-07-19 NOTE — PROGRESS NOTES
Discussed patient vitals and lab work with Dr Celi Haque. Patient typically runs a low BP and is not symptomatic Patient has orders for Albumin infusion with greater than 5000cc paracentesis. Updated regarding lab results as well.

## 2019-07-19 NOTE — PROGRESS NOTES
Pt sunglasses found when stripping stretcher.   Care takers phone called and message left, will take glasses to security

## 2019-07-19 NOTE — PROGRESS NOTES
Pt discharged via wheelchair to car in care of care taker,  Discharge inst given and reviewed, both verbalize understanding. Pt denies any pain or distress at time of discharge.   Dressing to right lower abd clean, dry and intact

## 2019-07-19 NOTE — PROGRESS NOTES
Pt returned from us,  Albumin infused, iv discontinued, pressure to area, area did not bleed much after removeal of iv cath.   Quarter size area of ecchymosis noted, 2x2 and coban to site

## 2019-08-03 NOTE — PROGRESS NOTES
[address] 137 [facility] Pilar Lovell MD, Ana Casey [facility] MD Rogerio Freedman, PA-CAITLYN Dickinson, [facility] April S Kimmy, [facility]   Michele Sandoval, P-C Jody Woo, [facility] [address] 
  at [address], [address] [place] [address] [address] [facility] [address]. 
  [phone] FAX: [address] 
  [facility] 
  [address], [address] 98 [facility], [address] 
  [phone] FAX: [phone] Patient Care Team: 
Shaka Storey MD as PCP - General (Internal Medicine) Problem List  Date Reviewed: 8/3/2019 Codes Class Noted Cirrhosis ([address] ICD-10-CM: K74.60 ICD-9-CM: 571.5  4/7/2019 Alcohol abuse ICD-10-CM: F10.10 ICD-9-CM: 305.00  4/7/2019 Esophageal varices with bleeding (HCC) ICD-10-CM: I85.01 
ICD-9-CM: 456.0  4/7/2019 Esophageal ulcer ICD-10-CM: K22.10 ICD-9-CM: 530.20  4/7/2019 Ascites ICD-10-CM: R18.8 ICD-9-CM: 789.59  4/7/2019 Vonda Salvage returns to the [place] [address] for management of cirrhosis secondary to alcoholic liver disease. The active problem list, all pertinent past medical history, medications, endoscopic studies, radiologic findings and laboratory findings related to the liver disorder were reviewed with the patient. The patient is a 47 y.o. Black male who was found to have chronic liver disease and cirrhosis in 5/2018 when he was hospitalized at [facility] for upper GI bleeding. An assessment of liver fibrosis with biopsy or elastography has not been performed. Serologic evaluation for markers of chronic liver disease was negative.  
 
The patient has developed the following complications of cirrhosis: esophageal varices, variceal bleeding, ascites, The patient states that he has now stopped consuming alcohol for the past 1 month. The patient notes fatigue, swelling of the abdomen, The patient has again accumulated ascites and is need of paracentesis. Will have this scheduled in the next 1-2 days. The patient has not experienced pain in the right side over the liver, yellowing of the eyes or skin, problems concentrating, The patient has limitations in functional activities which can be attributed to the liver disease. ASSESSMENT AND PLAN: 
Cirrhosis Cirrhosis is presumed secondary to alcohol. The diagnosis of cirrhosis is based upon complications of cirrhosis. The CTP is 12. Child class C. The MELD score is 27. The patient has a 90 day day mortality of 20% and a 2 year survival of 2%. This will only be improved if he stops consuming alcohol. Lower extremity edema Edema has resolved with current dose of diuretics. Will continue the current dose of diuretics at step 1. Screening for Esophageal varices The patient has esophageal varices with prior bleeding. The last EGD to assess for varices was performed in 5/2019. Small esophageal varices were present. Will schedule for EGD to assess for varices and need for banding in 5/2010. The patient is on a beta-blocker to reduce the risk of variceal hemorrhage. Will stop this medication because the patient has ascites and this is associated with an increased risk of DEJAN and HRS. Hepatic encephalopathy Overt HE has recently developed. Will initiate treatment with lactulose. There is no need to restrict dietary protein at this time. Coagulopathy This is secondary to cirrhosis, and malnutrition, from alcohol abuse. There is no evidence of bleeding. No need for FFP at this time. The patient masters snot need to be hospitalized at this time. Screening for Hepatocellular Carcinoma [address] screening will be performed AFP was ordered today and ultrasound will be scheduled. Treatment of other medical problems in patients with chronic liver disease The patient has cirrhrosis and should avoid Benzodiazapines which could exacerbate HE, NSAIDs which are associated with a higher rate of developing DEJAN. The patient can take the following medications as these will not impact the patient's current liver disease. Any medications utilized for treatment of DM, Statins to treat hypercholesterolemia The patient consumes alcohol on a regular basis. This increases the risk of toxicity from acetaminophen. This analgesic should be avoided until the patient has been abstinent from alcohol for 6 months. Counseling for alcohol in patients with chronic liver disease The patient has cirrhosis and was advised to be abstinent from all alcohol including non-alcoholic beer which does contain some alcohol. The patient has an alcohol abuse disorder and it was suggested to enter alcohol counseling or attend AA. Vaccinations The need for vaccination against viral hepatitis A and B will be assessed with serologic and instituted as appropriate. Routine vaccinations against other bacterial and viral agents can be performed as indicated. Annual flu vaccination should be administered if indicated. ALLERGIES Allergies Allergen Reactions  Bee Sting [Sting, Bee] Anaphylaxis MEDICATIONS Current Outpatient Medications Medication Sig  
 lactulose (CHRONULAC) 10 gram/15 mL solution Take 30 mL by mouth two (2) times a day. Indications: impaired brain function due to liver disease  furosemide (LASIX) 40 mg tablet Take 2 Tabs by mouth daily.  magnesium oxide (MAG-OX) 400 mg tablet Take 400 mg by mouth daily.  spironolactone (ALDACTONE) 100 mg tablet Take 2 Tabs by mouth daily.  FOLIC ACID PO Take 1 mg by mouth daily.  omeprazole (PRILOSEC) 40 mg capsule Take 40 mg by mouth daily.  ibuprofen 200 mg cap Take 800 mg by mouth. No current facility-administered medications for this visit. SYSTEM REVIEW NOT RELATED TO LIVER DISEASE OR REVIEWED ABOVE: 
Constitution systems: Negative for fever, chills, weight gain, weight loss. Eyes: Negative for visual changes. ENT: Negative for sore throat, painful swallowing. Respiratory: Negative for cough, hemoptysis, SOB. Cardiology: Negative for chest pain, palpitations. GI:  Negative for constipation or diarrhea. : Negative for urinary frequency, dysuria, hematuria, nocturia. Skin: Negative for rash. Hematology: Negative for easy bruising, blood clots. Musculo-skelatal: Negative for back pain, muscle pain, weakness. Neurologic: Negative for headaches, dizziness, vertigo, memory problems not related to HE. Psychology: Negative for anxiety, depression. FAMILY HISTORY: 
The father  of DM and ETOH abuse. The mother  of dementia. SOCIAL HISTORY: 
The patient has never been . The patient has 3 children, The patient currently smokes 1 pack of tobacco daily. 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 Renae Maddox PHYSICAL EXAMINATION: 
Visit Vitals /53 (BP 1 Location: Left arm, BP Patient Position: Sitting) Pulse (!) 112 Temp 98.3 °F (36.8 °C) (Tympanic) Resp 22 Ht 5' 11\" (1.803 m) Wt 169 lb (76.7 kg) SpO2 98% BMI 23.57 kg/m² General: No acute distress. Eyes: Sclera anicteric. ENT: No oral lesions. Thyroid normal. 
Nodes: No adenopathy. Skin: No spider angiomata. No jaundice. No palmar erythema. Respiratory: Lungs clear to auscultation. Cardiovascular: Regular heart rate. No murmurs. No JVD. Abdomen: Soft non-tender. Liver size normal to percussion/palpation. Spleen not palpable. No obvious ascites. Extremities: No edema. No muscle wasting. No gross arthritic changes. Neurologic: Alert and oriented. Cranial nerves grossly intact. No asterixis. LABORATORY STUDIES: 
Liver [place] of [id number] Units 7/19/2019 6/5/2019 WBC 4.6 - 13.2 K/uL  10.0 ANC 1.8 - 8.0 K/UL  5.0 HGB 13.0 - 16.0 g/dL  9.0 (L)  - 420 K/uL 134 (L) 110 (L) INR 0.8 - 1.2   3.2 (H) 2.6 (H) AST 15 - 37 U/L  86 (H) ALT 16 - 61 U/L  26 Alk Phos 45 - 117 U/L  171 (H) Bili, Total 0.2 - 1.0 MG/DL  5.0 (H) Bili, Direct 0.0 - 0.2 MG/DL  2.7 (H) Albumin 3.4 - 5.0 g/dL  1.8 (L) BUN 7.0 - 18 MG/DL  10 Creat 0.6 - 1.3 MG/DL  1.06 Na 136 - 145 mmol/L  132 (L)  
K 3.5 - 5.5 mmol/L  4.3 Cl 100 - 108 mmol/L  99 (L)  
CO2 21 - 32 mmol/L  26 Glucose 74 - 99 mg/dL  69 (L) SEROLOGIES: 
Serologies Latest Ref Rng & Units 5/9/2019 Hep A Ab, Total NEGATIVE   Positive (A) Hep B Surface Ag <1.00 Index <0.10 Hep B Surface Ag Interp NEG   NEGATIVE Hep B Core Ab, Total NEGATIVE   NEGATIVE Hep B Surface Ab >10.0 mIU/mL <3.10 (L) Hep B Surface Ab Interp POS   NEGATIVE (A) Hep C Ab 0.0 - 0.9 s/co ratio 0.2 Ferritin 8 - 388 NG/ML 72 Iron % Saturation % 41 LIVER HISTOLOGY: 
Not available or performed ENDOSCOPIC PROCEDURES: 
Not available or performed RADIOLOGY: 
Not available or performed OTHER TESTING: 
Not available or performed FOLLOW-UP: 
All of the issues listed above in the Assessment and Plan were discussed with the patient. All questions were answered. The patient expressed a clear understanding of the above. [address] [place] [address] in 1 week for elastography to review all data and determine the treatment plan. Mehran Esteves MD 
[address] [address], [address] Gerhardt Piedra, [address] 
[phone] [address] [place] [address]